# Patient Record
Sex: FEMALE | Race: WHITE | URBAN - METROPOLITAN AREA
[De-identification: names, ages, dates, MRNs, and addresses within clinical notes are randomized per-mention and may not be internally consistent; named-entity substitution may affect disease eponyms.]

---

## 2022-10-25 ENCOUNTER — APPOINTMENT (RX ONLY)
Dept: URBAN - METROPOLITAN AREA CLINIC 75 | Facility: CLINIC | Age: 62
Setting detail: DERMATOLOGY
End: 2022-10-25

## 2022-10-25 DIAGNOSIS — L40.0 PSORIASIS VULGARIS: ICD-10-CM | Status: INADEQUATELY CONTROLLED

## 2022-10-25 PROCEDURE — ? CHRONOLOGY OF PRESENT ILLNESS

## 2022-10-25 PROCEDURE — ? COUNSELING

## 2022-10-25 PROCEDURE — ? PRESCRIPTION MEDICATION MANAGEMENT

## 2022-10-25 PROCEDURE — ? PRESCRIPTION

## 2022-10-25 PROCEDURE — 99204 OFFICE O/P NEW MOD 45 MIN: CPT

## 2022-10-25 RX ORDER — CLOBETASOL PROPIONATE 0.5 MG/ML
2-3 DROPS SOLUTION TOPICAL BID
Qty: 50 | Refills: 3 | Status: ERX

## 2022-10-25 RX ORDER — FLUOCINOLONE ACETONIDE 0.11 MG/ML
2-3 DROPS OIL TOPICAL
Qty: 118.28 | Refills: 3 | Status: ERX

## 2022-10-25 RX ORDER — ROFLUMILAST 3 MG/G
THIN LAYER CREAM TOPICAL
Qty: 60 | Refills: 1 | Status: ERX

## 2022-10-25 ASSESSMENT — LOCATION DETAILED DESCRIPTION DERM
LOCATION DETAILED: RIGHT PROXIMAL DORSAL FOREARM
LOCATION DETAILED: LEFT ANTERIOR PROXIMAL THIGH
LOCATION DETAILED: PERIUMBILICAL SKIN
LOCATION DETAILED: RIGHT ANTERIOR PROXIMAL THIGH
LOCATION DETAILED: RIGHT MEDIAL FRONTAL SCALP
LOCATION DETAILED: LEFT PROXIMAL DORSAL FOREARM
LOCATION DETAILED: INFERIOR LUMBAR SPINE
LOCATION DETAILED: SUBXIPHOID
LOCATION DETAILED: LEFT DISTAL POSTERIOR THIGH

## 2022-10-25 ASSESSMENT — PGA PSORIASIS: PGA PSORIASIS 2020: MODERATE

## 2022-10-25 ASSESSMENT — LOCATION ZONE DERM
LOCATION ZONE: SCALP
LOCATION ZONE: ARM
LOCATION ZONE: TRUNK
LOCATION ZONE: LEG

## 2022-10-25 ASSESSMENT — LOCATION SIMPLE DESCRIPTION DERM
LOCATION SIMPLE: LOWER BACK
LOCATION SIMPLE: LEFT FOREARM
LOCATION SIMPLE: LEFT THIGH
LOCATION SIMPLE: RIGHT THIGH
LOCATION SIMPLE: LEFT POSTERIOR THIGH
LOCATION SIMPLE: RIGHT SCALP
LOCATION SIMPLE: ABDOMEN
LOCATION SIMPLE: RIGHT FOREARM

## 2022-10-25 ASSESSMENT — BSA PSORIASIS: % BODY COVERED IN PSORIASIS: 5

## 2022-10-25 NOTE — PROCEDURE: PRESCRIPTION MEDICATION MANAGEMENT
May 7, 2020      Bayron Golden MD  [address] [place] [address]           [facility]  [address]  [place] [address]  Phone: [phone]          Patient: Erin Clark   MR Number: [id number]   YOB: 1935   Date of Visit: 5/7/2020       Dear Dr. Bayron Golden:    Thank you for referring Erin Clark to me for evaluation. Attached you will find relevant portions of my assessment and plan of care.    If you have questions, please do not hesitate to call me. I look forward to following Erin Clark along with you.    Sincerely,    Lary Navarrete, NP    Enclosure  CC:  No Recipients    If you would like to receive this communication electronically, please contact [email or web address] or [phone] to request more information on [employer] access.    For providers and/or their staff who would like to refer a patient to [facility], please contact us through our one-stop-shop provider referral line, [facility] , at [phone].    If you feel you have received this communication in error or would no longer like to receive these types of communications, please e-mail [email or web address]         
Render In Strict Bullet Format?: No
Detail Level: Zone
Plan: Zoryve 0.3 % topical cream -thin layer to AA of psoriasis QD\\nclobetasol 0.05 % scalp solution BID - thin layer to AA of scalp for up to 2 weeks per month as needed for psoriasis. \\nDerma-Smoothe/FS Scalp Oil 0.01 % - thin layer to scalp at bedtime once weekly. Leave on for 8 hours and wash out in morning.

## 2022-10-25 NOTE — PROCEDURE: CHRONOLOGY OF PRESENT ILLNESS
Chronology Of Present Illness: 10/25/22 Initial presentation to office for longstanding history of psoriasis (predominately inverse psoriasis) since teenage years. \\n \\nPrevious medications: Lidex, Clobetasol, Mometasone \\n\\n
Detail Level: Zone

## 2022-11-22 ENCOUNTER — APPOINTMENT (RX ONLY)
Dept: URBAN - METROPOLITAN AREA CLINIC 75 | Facility: CLINIC | Age: 62
Setting detail: DERMATOLOGY
End: 2022-11-22

## 2022-11-22 DIAGNOSIS — L98.9 DISORDER OF THE SKIN AND SUBCUTANEOUS TISSUE, UNSPECIFIED: ICD-10-CM

## 2022-11-22 DIAGNOSIS — L40.0 PSORIASIS VULGARIS: ICD-10-CM | Status: INADEQUATELY CONTROLLED

## 2022-11-22 PROCEDURE — ? PRESCRIPTION

## 2022-11-22 PROCEDURE — 99214 OFFICE O/P EST MOD 30 MIN: CPT

## 2022-11-22 PROCEDURE — ? PRESCRIPTION MEDICATION MANAGEMENT

## 2022-11-22 PROCEDURE — ? CHRONOLOGY OF PRESENT ILLNESS

## 2022-11-22 PROCEDURE — ? COUNSELING

## 2022-11-22 RX ORDER — FLUOCINONIDE 0.5 MG/ML
4-5 DROPS SOLUTION TOPICAL AS DIRECTED
Qty: 60 | Refills: 2 | Status: ERX

## 2022-11-22 RX ORDER — TAPINAROF 10 MG/1000MG
THIN LAYER CREAM TOPICAL QD
Qty: 60 | Refills: 1 | Status: ERX

## 2022-11-22 ASSESSMENT — LOCATION DETAILED DESCRIPTION DERM
LOCATION DETAILED: LEFT ANTERIOR PROXIMAL THIGH
LOCATION DETAILED: PERIUMBILICAL SKIN
LOCATION DETAILED: LEFT PROXIMAL DORSAL FOREARM
LOCATION DETAILED: RIGHT PROXIMAL DORSAL FOREARM
LOCATION DETAILED: INFERIOR LUMBAR SPINE
LOCATION DETAILED: SUBXIPHOID
LOCATION DETAILED: RIGHT MEDIAL FRONTAL SCALP
LOCATION DETAILED: RIGHT ANTERIOR PROXIMAL THIGH
LOCATION DETAILED: LEFT DISTAL POSTERIOR THIGH

## 2022-11-22 ASSESSMENT — LOCATION SIMPLE DESCRIPTION DERM
LOCATION SIMPLE: LEFT THIGH
LOCATION SIMPLE: RIGHT SCALP
LOCATION SIMPLE: LEFT FOREARM
LOCATION SIMPLE: RIGHT THIGH
LOCATION SIMPLE: ABDOMEN
LOCATION SIMPLE: LEFT POSTERIOR THIGH
LOCATION SIMPLE: LOWER BACK
LOCATION SIMPLE: RIGHT FOREARM

## 2022-11-22 ASSESSMENT — LOCATION ZONE DERM
LOCATION ZONE: LEG
LOCATION ZONE: TRUNK
LOCATION ZONE: ARM
LOCATION ZONE: SCALP

## 2022-11-22 ASSESSMENT — BSA PSORIASIS: % BODY COVERED IN PSORIASIS: 10

## 2022-11-22 ASSESSMENT — PGA PSORIASIS: PGA PSORIASIS 2020: MODERATE

## 2022-11-22 NOTE — PROCEDURE: PRESCRIPTION MEDICATION MANAGEMENT
Plan: Continue Zoryve 0.3 % topical cream -thin layer to AA of psoriasis QD\\nTrial of Vtama 1% topical cream - thin layer to AA of psoriasis QD\\nFluocinonide 0.05% solution - QHS up to 2-3 weeks per month. discontinue sooner if symptoms resolve\\n\\nReview tremfya and skyrizi information.  Will discuss in greater detail at next appointment
Detail Level: Zone
Render In Strict Bullet Format?: No
Samples Given: Halog
Plan: Apply thin layer of Halog onto AA of fingers BID x 2 weeks.  Discontinue sooner if symptoms resolve

## 2022-11-22 NOTE — PROCEDURE: CHRONOLOGY OF PRESENT ILLNESS
Chronology Of Present Illness: 10/25/22 Initial presentation to office for longstanding history of psoriasis (predominately inverse psoriasis) to scalp, skin folds, and elbows since\\nteenage years. \\nPrevious medications: Lidex (improvement to scalp), Clobetasol, Mometasone (minimal improvement)\\n\\n11/22/22\\nPatient struggling with consistency of medication application but reports improvement when she uses them. Discussed Tremfya and Skyrizi today as alternative treatment options. Gave patient information brochures today. Will try start trial of vtama, Continue with Zoryve, and begin Fluocinonide 0.05% to scalp, which patient has used in the past with improvement.
Detail Level: Zone

## 2023-01-17 ENCOUNTER — APPOINTMENT (RX ONLY)
Dept: URBAN - METROPOLITAN AREA CLINIC 75 | Facility: CLINIC | Age: 63
Setting detail: DERMATOLOGY
End: 2023-01-17

## 2023-01-17 DIAGNOSIS — L40.0 PSORIASIS VULGARIS: ICD-10-CM

## 2023-01-17 DIAGNOSIS — L57.8 OTHER SKIN CHANGES DUE TO CHRONIC EXPOSURE TO NONIONIZING RADIATION: ICD-10-CM

## 2023-01-17 PROCEDURE — ? CHRONOLOGY OF PRESENT ILLNESS

## 2023-01-17 PROCEDURE — ? PRESCRIPTION

## 2023-01-17 PROCEDURE — ? COUNSELING

## 2023-01-17 PROCEDURE — ? PRESCRIPTION MEDICATION MANAGEMENT

## 2023-01-17 PROCEDURE — 99214 OFFICE O/P EST MOD 30 MIN: CPT

## 2023-01-17 RX ORDER — TRETIONIN 0.25 MG/G
PEA SIZED CREAM TOPICAL QHS
Qty: 45 | Refills: 1 | Status: ERX

## 2023-01-17 ASSESSMENT — LOCATION SIMPLE DESCRIPTION DERM
LOCATION SIMPLE: LEFT THIGH
LOCATION SIMPLE: RIGHT THIGH
LOCATION SIMPLE: LOWER BACK
LOCATION SIMPLE: RIGHT FOREARM
LOCATION SIMPLE: LEFT FOREARM
LOCATION SIMPLE: ABDOMEN
LOCATION SIMPLE: RIGHT SCALP
LOCATION SIMPLE: LEFT POSTERIOR THIGH

## 2023-01-17 ASSESSMENT — LOCATION DETAILED DESCRIPTION DERM
LOCATION DETAILED: LEFT ANTERIOR PROXIMAL THIGH
LOCATION DETAILED: RIGHT ANTERIOR PROXIMAL THIGH
LOCATION DETAILED: LEFT PROXIMAL DORSAL FOREARM
LOCATION DETAILED: INFERIOR LUMBAR SPINE
LOCATION DETAILED: SUBXIPHOID
LOCATION DETAILED: PERIUMBILICAL SKIN
LOCATION DETAILED: LEFT DISTAL POSTERIOR THIGH
LOCATION DETAILED: RIGHT PROXIMAL DORSAL FOREARM
LOCATION DETAILED: RIGHT MEDIAL FRONTAL SCALP

## 2023-01-17 ASSESSMENT — LOCATION ZONE DERM
LOCATION ZONE: ARM
LOCATION ZONE: SCALP
LOCATION ZONE: TRUNK
LOCATION ZONE: LEG

## 2023-01-17 NOTE — PROCEDURE: CHRONOLOGY OF PRESENT ILLNESS
Chronology Of Present Illness: 10/25/22 Initial presentation to office for longstanding history of psoriasis (predominately inverse psoriasis) to scalp, skin folds, and elbows since\\nteenage years. \\nPrevious medications: Lidex (improvement to scalp), Clobetasol, Mometasone (minimal improvement)\\n\\n11/22/22\\nPatient struggling with consistency of medication application but reports improvement when she uses them. Discussed Tremfya and Skyrizi today as alternative treatment options. Gave patient information brochures today. Will try start trial of vtama, Continue with Zoryve, and begin Fluocinonide 0.05% to scalp, which patient has used in the past with improvement. \\n\\n1/17/23\\nPatient struggling with complaince of topicals. She felt that [employer] sample worked better than zoryve but awaiting PA response from insurance for [employer]. She is not interested in biologic therapy at this time and would like to try dietary changes first. She is smoker and I recommended smoking cessation and recommended the [place] quit line.
Detail Level: Zone

## 2023-01-17 NOTE — PROCEDURE: PRESCRIPTION MEDICATION MANAGEMENT
Plan: Continue Zoryve 0.3 % topical cream -thin layer to AA of psoriasis QD\\nTrial of Vtama 1% topical cream - thin layer to AA of psoriasis QD\\nFluocinonide 0.05% solution - QHS up to 2-3 weeks per months PRN for flares.
Detail Level: Zone
Render In Strict Bullet Format?: No
Plan: tretinoin 0.025 % topical cream -pea sized amount to clean face QHS. Starting twice weekly and advancing to nightly as tolerated.

## 2023-02-07 ENCOUNTER — RX ONLY (OUTPATIENT)
Age: 63
Setting detail: RX ONLY
End: 2023-02-07

## 2023-02-07 RX ORDER — TRETIONIN 0.25 MG/G
PEA SIZED CREAM TOPICAL QHS
Qty: 45 | Refills: 1 | Status: ERX

## 2023-05-24 ENCOUNTER — APPOINTMENT (RX ONLY)
Dept: URBAN - METROPOLITAN AREA CLINIC 75 | Facility: CLINIC | Age: 63
Setting detail: DERMATOLOGY
End: 2023-05-24

## 2023-05-24 DIAGNOSIS — L40.0 PSORIASIS VULGARIS: ICD-10-CM | Status: IMPROVED

## 2023-05-24 DIAGNOSIS — L57.8 OTHER SKIN CHANGES DUE TO CHRONIC EXPOSURE TO NONIONIZING RADIATION: ICD-10-CM

## 2023-05-24 PROCEDURE — ? PRESCRIPTION

## 2023-05-24 PROCEDURE — ? CHRONOLOGY OF PRESENT ILLNESS

## 2023-05-24 PROCEDURE — ? COUNSELING

## 2023-05-24 PROCEDURE — 99213 OFFICE O/P EST LOW 20 MIN: CPT

## 2023-05-24 PROCEDURE — ? PRESCRIPTION MEDICATION MANAGEMENT

## 2023-05-24 RX ORDER — TRETIONIN 0.5 MG/G
PEA SIZE CREAM TOPICAL QHS
Qty: 20 | Refills: 3 | Status: ERX

## 2023-05-24 RX ORDER — FLUOCINONIDE 0.5 MG/ML
THIN LAYER SOLUTION TOPICAL AS DIRECTED
Qty: 60 | Refills: 2 | Status: ERX

## 2023-05-24 ASSESSMENT — LOCATION DETAILED DESCRIPTION DERM
LOCATION DETAILED: SUBXIPHOID
LOCATION DETAILED: RIGHT PROXIMAL DORSAL FOREARM
LOCATION DETAILED: LEFT PROXIMAL DORSAL FOREARM
LOCATION DETAILED: RIGHT ANTERIOR PROXIMAL THIGH
LOCATION DETAILED: LEFT ANTERIOR PROXIMAL THIGH
LOCATION DETAILED: PERIUMBILICAL SKIN
LOCATION DETAILED: RIGHT MEDIAL FRONTAL SCALP
LOCATION DETAILED: LEFT DISTAL POSTERIOR THIGH
LOCATION DETAILED: INFERIOR LUMBAR SPINE

## 2023-05-24 ASSESSMENT — LOCATION SIMPLE DESCRIPTION DERM
LOCATION SIMPLE: LEFT THIGH
LOCATION SIMPLE: RIGHT SCALP
LOCATION SIMPLE: LOWER BACK
LOCATION SIMPLE: RIGHT FOREARM
LOCATION SIMPLE: LEFT POSTERIOR THIGH
LOCATION SIMPLE: RIGHT THIGH
LOCATION SIMPLE: ABDOMEN
LOCATION SIMPLE: LEFT FOREARM

## 2023-05-24 ASSESSMENT — LOCATION ZONE DERM
LOCATION ZONE: SCALP
LOCATION ZONE: LEG
LOCATION ZONE: ARM
LOCATION ZONE: TRUNK

## 2023-05-24 NOTE — PROCEDURE: PRESCRIPTION MEDICATION MANAGEMENT
Plan: Continue Zoryve or VTAMA cream -thin layer to AA of psoriasis QD\\n for flares \\nFluocinonide 0.05% solution - QHS up to 2-3 weeks per months PRN for flares.
Detail Level: Zone
Render In Strict Bullet Format?: No
Plan: tretinoin 0.05 % topical cream -pea sized amount to clean face QHS. Starting twice weekly and advancing to nightly as tolerated.

## 2023-05-24 NOTE — PROCEDURE: CHRONOLOGY OF PRESENT ILLNESS
Chronology Of Present Illness: 10/25/22 Initial presentation to office for longstanding history of psoriasis (predominately inverse psoriasis) to scalp, skin folds, and elbows since\\nteenage years. \\nPrevious medications: Lidex (improvement to scalp), Clobetasol, Mometasone (minimal improvement)\\n\\n11/22/22\\nPatient struggling with consistency of medication application but reports improvement when she uses them. Discussed Tremfya and Skyrizi today as alternative treatment options. Gave patient information brochures today. Will try start trial of vtama, Continue with Zoryve, and begin Fluocinonide 0.05% to scalp, which patient has used in the past with improvement. \\n\\n1/17/23\\nPatient struggling with complaince of topicals. She felt that [employer]  sample worked better than zoryve but awaiting PA response from insurance for [employer] . She is not interested in biologic therapy at this time and would like to try dietary changes first. She is smoker and I recommended smoking cessation and recommended the [place] quit line. \\n\\n5/24/23\\nSignificant improved on extremities. Patient reports making some dietary lifestyle changes which seem to have helped psoriasis. She did not use VTAMA or zoryve. Reports scalp being only area that still flares. Will send in refills of fluocinonide solution. Patient still smokes occasionally, but trying to stop. Patient to f/u in 1 year or sooner if psoriasis worsens.
Detail Level: Zone

## 2024-08-12 ENCOUNTER — APPOINTMENT (RX ONLY)
Dept: URBAN - METROPOLITAN AREA CLINIC 75 | Facility: CLINIC | Age: 64
Setting detail: DERMATOLOGY
End: 2024-08-12

## 2024-08-12 DIAGNOSIS — L40.0 PSORIASIS VULGARIS: ICD-10-CM

## 2024-08-12 DIAGNOSIS — Z85.828 PERSONAL HISTORY OF OTHER MALIGNANT NEOPLASM OF SKIN: ICD-10-CM

## 2024-08-12 DIAGNOSIS — L57.8 OTHER SKIN CHANGES DUE TO CHRONIC EXPOSURE TO NONIONIZING RADIATION: ICD-10-CM

## 2024-08-12 PROCEDURE — ? PRESCRIPTION

## 2024-08-12 PROCEDURE — ? CHRONOLOGY OF PRESENT ILLNESS

## 2024-08-12 PROCEDURE — ? ADDITIONAL NOTES

## 2024-08-12 PROCEDURE — ? PRESCRIPTION MEDICATION MANAGEMENT

## 2024-08-12 PROCEDURE — ? COUNSELING

## 2024-08-12 PROCEDURE — 99213 OFFICE O/P EST LOW 20 MIN: CPT

## 2024-08-12 RX ORDER — TRIAMCINOLONE ACETONIDE 0.25 MG/G
THIN LAYER CREAM TOPICAL BID
Qty: 15 | Refills: 0 | Status: ERX | COMMUNITY
Start: 2024-08-12

## 2024-08-12 RX ORDER — TRETIONIN 1 MG/G
THIN LAYER CREAM TOPICAL QHS
Qty: 45 | Refills: 3 | Status: ERX | COMMUNITY
Start: 2024-08-12

## 2024-08-12 RX ADMIN — TRIAMCINOLONE ACETONIDE THIN LAYER: 0.25 CREAM TOPICAL at 00:00

## 2024-08-12 RX ADMIN — TRETIONIN THIN LAYER: 1 CREAM TOPICAL at 00:00

## 2024-08-12 ASSESSMENT — LOCATION SIMPLE DESCRIPTION DERM
LOCATION SIMPLE: RIGHT SCALP
LOCATION SIMPLE: LEFT POSTERIOR THIGH
LOCATION SIMPLE: LOWER BACK
LOCATION SIMPLE: RIGHT FOREHEAD
LOCATION SIMPLE: ABDOMEN
LOCATION SIMPLE: LEFT THIGH
LOCATION SIMPLE: RIGHT THIGH
LOCATION SIMPLE: RIGHT FOREARM
LOCATION SIMPLE: LEFT FOREARM

## 2024-08-12 ASSESSMENT — LOCATION ZONE DERM
LOCATION ZONE: SCALP
LOCATION ZONE: ARM
LOCATION ZONE: TRUNK
LOCATION ZONE: FACE
LOCATION ZONE: LEG

## 2024-08-12 ASSESSMENT — LOCATION DETAILED DESCRIPTION DERM
LOCATION DETAILED: PERIUMBILICAL SKIN
LOCATION DETAILED: RIGHT PROXIMAL DORSAL FOREARM
LOCATION DETAILED: INFERIOR LUMBAR SPINE
LOCATION DETAILED: SUBXIPHOID
LOCATION DETAILED: LEFT PROXIMAL DORSAL FOREARM
LOCATION DETAILED: RIGHT ANTERIOR PROXIMAL THIGH
LOCATION DETAILED: RIGHT INFERIOR LATERAL FOREHEAD
LOCATION DETAILED: RIGHT MEDIAL FRONTAL SCALP
LOCATION DETAILED: LEFT ANTERIOR PROXIMAL THIGH
LOCATION DETAILED: LEFT DISTAL POSTERIOR THIGH

## 2024-08-12 ASSESSMENT — BSA PSORIASIS: % BODY COVERED IN PSORIASIS: 2

## 2024-08-12 ASSESSMENT — PGA PSORIASIS: PGA PSORIASIS 2020: ALMOST CLEAR

## 2024-08-12 ASSESSMENT — ITCH NUMERIC RATING SCALE: HOW SEVERE IS YOUR ITCHING?: 1

## 2024-08-12 NOTE — PROCEDURE: CHRONOLOGY OF PRESENT ILLNESS
Chronology Of Present Illness: 10/25/22 Initial presentation to office for longstanding history of psoriasis (predominately inverse psoriasis) to scalp, skin folds, and elbows since\\nteenage years.\\nPrevious medications: Lidex (improvement to scalp), Clobetasol, Mometasone (minimal improvement)\\n\\n11/22/22\\nPatient struggling with consistency of medication application but reports improvement when she uses them. Discussed Tremfya and Skyrizi today as alternative treatment options. Gave patient information brochures today. Will try start trial of vtama, Continue with Zoryve, and begin Fluocinonide 0.05% to scalp, which patient has used in the past with improvement.\\n\\n1/17/23\\nPatient struggling with complaince of topicals. She felt that VTAMA sample worked better than zoryve but awaiting PA response from insurance for VTAMA. She is not interested in biologic therapy at this time and would like to try dietary changes first. She is smoker and I recommended smoking cessation and recommended the [place] quit line.\\n\\n5/24/23\\nSignificant improved on extremities. Patient reports making some dietary lifestyle changes which seem to have helped psoriasis. She did not use VTAMA or zoryve.  Reports scalp being only area that still flares. Will send in refills of fluocinonide solution. Patient still smokes occasionally, but trying to stop. Patient to f/u in 1 year or sooner if psoriasis worsens.\\n\\n8/12/24\\nPsoriasis nearly clear.  Flare to inframammary creases, will send TAC. Has not needed to use topicals in several months.  Photos taken  today. Pt to f/u in 1 month for recheck.
Detail Level: Zone

## 2024-08-12 NOTE — PROCEDURE: PRESCRIPTION MEDICATION MANAGEMENT
Continue Regimen: Fluocinonide 0.05% solution - QHS up to 2-3 weeks per months PRN for flares.
Initiate Treatment: triamcinolone acetonide 0.025 % topical cream BID: Apply thin layer to AA on chest BID x2 weeks.
Detail Level: Zone
Render In Strict Bullet Format?: No
Discontinue Regimen: tretinoin 0.05 % topical cream -pea sized amount to clean face QHS. Starting twice weekly and advancing to nightly as tolerated.
Initiate Treatment: tretinoin 0.1 % topical cream QHS: Apply a pea size amount to entire face QHS

## 2024-09-17 ENCOUNTER — APPOINTMENT (RX ONLY)
Dept: URBAN - METROPOLITAN AREA CLINIC 75 | Facility: CLINIC | Age: 64
Setting detail: DERMATOLOGY
End: 2024-09-17

## 2024-09-17 DIAGNOSIS — L57.8 OTHER SKIN CHANGES DUE TO CHRONIC EXPOSURE TO NONIONIZING RADIATION: ICD-10-CM

## 2024-09-17 DIAGNOSIS — L40.0 PSORIASIS VULGARIS: ICD-10-CM | Status: WELL CONTROLLED

## 2024-09-17 DIAGNOSIS — Z85.828 PERSONAL HISTORY OF OTHER MALIGNANT NEOPLASM OF SKIN: ICD-10-CM

## 2024-09-17 DIAGNOSIS — L82.1 OTHER SEBORRHEIC KERATOSIS: ICD-10-CM

## 2024-09-17 DIAGNOSIS — D22 MELANOCYTIC NEVI: ICD-10-CM

## 2024-09-17 PROBLEM — D22.61 MELANOCYTIC NEVI OF RIGHT UPPER LIMB, INCLUDING SHOULDER: Status: ACTIVE | Noted: 2024-09-17

## 2024-09-17 PROBLEM — D22.5 MELANOCYTIC NEVI OF TRUNK: Status: ACTIVE | Noted: 2024-09-17

## 2024-09-17 PROBLEM — D22.39 MELANOCYTIC NEVI OF OTHER PARTS OF FACE: Status: ACTIVE | Noted: 2024-09-17

## 2024-09-17 PROBLEM — D22.62 MELANOCYTIC NEVI OF LEFT UPPER LIMB, INCLUDING SHOULDER: Status: ACTIVE | Noted: 2024-09-17

## 2024-09-17 PROBLEM — D22.72 MELANOCYTIC NEVI OF LEFT LOWER LIMB, INCLUDING HIP: Status: ACTIVE | Noted: 2024-09-17

## 2024-09-17 PROBLEM — D22.71 MELANOCYTIC NEVI OF RIGHT LOWER LIMB, INCLUDING HIP: Status: ACTIVE | Noted: 2024-09-17

## 2024-09-17 PROCEDURE — ? SUNSCREEN RECOMMENDATIONS

## 2024-09-17 PROCEDURE — ? CHRONOLOGY OF PRESENT ILLNESS

## 2024-09-17 PROCEDURE — ? ADDITIONAL NOTES

## 2024-09-17 PROCEDURE — ? COUNSELING

## 2024-09-17 PROCEDURE — 99213 OFFICE O/P EST LOW 20 MIN: CPT

## 2024-09-17 PROCEDURE — ? PRESCRIPTION MEDICATION MANAGEMENT

## 2024-09-17 ASSESSMENT — LOCATION DETAILED DESCRIPTION DERM
LOCATION DETAILED: RIGHT ANTERIOR DISTAL UPPER ARM
LOCATION DETAILED: RIGHT ANTERIOR PROXIMAL THIGH
LOCATION DETAILED: RIGHT PROXIMAL DORSAL FOREARM
LOCATION DETAILED: LEFT DISTAL POSTERIOR THIGH
LOCATION DETAILED: INFERIOR LUMBAR SPINE
LOCATION DETAILED: PERIUMBILICAL SKIN
LOCATION DETAILED: RIGHT ANTERIOR DISTAL THIGH
LOCATION DETAILED: MIDDLE STERNUM
LOCATION DETAILED: LEFT ANTERIOR PROXIMAL THIGH
LOCATION DETAILED: RIGHT INFERIOR LATERAL FOREHEAD
LOCATION DETAILED: INFERIOR MID FOREHEAD
LOCATION DETAILED: LEFT PROXIMAL DORSAL FOREARM
LOCATION DETAILED: SUBXIPHOID
LOCATION DETAILED: LEFT ANTERIOR DISTAL THIGH
LOCATION DETAILED: RIGHT INFERIOR MEDIAL FOREHEAD
LOCATION DETAILED: LEFT ANTERIOR DISTAL UPPER ARM
LOCATION DETAILED: RIGHT MEDIAL FRONTAL SCALP

## 2024-09-17 ASSESSMENT — LOCATION ZONE DERM
LOCATION ZONE: LEG
LOCATION ZONE: FACE
LOCATION ZONE: SCALP
LOCATION ZONE: TRUNK
LOCATION ZONE: ARM

## 2024-09-17 ASSESSMENT — LOCATION SIMPLE DESCRIPTION DERM
LOCATION SIMPLE: LEFT UPPER ARM
LOCATION SIMPLE: LEFT FOREARM
LOCATION SIMPLE: INFERIOR FOREHEAD
LOCATION SIMPLE: RIGHT FOREARM
LOCATION SIMPLE: RIGHT THIGH
LOCATION SIMPLE: LEFT POSTERIOR THIGH
LOCATION SIMPLE: RIGHT UPPER ARM
LOCATION SIMPLE: RIGHT SCALP
LOCATION SIMPLE: LEFT THIGH
LOCATION SIMPLE: ABDOMEN
LOCATION SIMPLE: RIGHT FOREHEAD
LOCATION SIMPLE: CHEST
LOCATION SIMPLE: LOWER BACK

## 2024-09-17 ASSESSMENT — BSA PSORIASIS: % BODY COVERED IN PSORIASIS: 5

## 2024-09-17 ASSESSMENT — PGA PSORIASIS: PGA PSORIASIS 2020: ALMOST CLEAR

## 2024-09-17 NOTE — PROCEDURE: ADDITIONAL NOTES
Detail Level: Detailed
Render Risk Assessment In Note?: no
Additional Notes: Pt reported- Mohs June 2019

## 2024-09-17 NOTE — PROCEDURE: PRESCRIPTION MEDICATION MANAGEMENT
Samples Given: Zoryve
Continue Regimen: Fluocinonide 0.05% solution- Apply thin layer QHS to scalp up to 2 weeks per month PRN for flares.\\ntriamcinolone acetonide 0.025 % topical cream BID- Apply thin layer QD to AA on chest, abdomen PRN for flares.\\nZoryve- Apply thin layer QD to AA PRN for flares (samples given, will call for rx)
Detail Level: Zone
Render In Strict Bullet Format?: No

## 2024-09-17 NOTE — PROCEDURE: CHRONOLOGY OF PRESENT ILLNESS
Chronology Of Present Illness: 10/25/22 Initial presentation to office for longstanding history of psoriasis (predominately inverse psoriasis) to scalp, skin folds, and elbows since\\nteenage years.\\nPrevious medications: Lidex (improvement to scalp), Clobetasol, Mometasone (minimal improvement)\\n\\n11/22/22\\nPatient struggling with consistency of medication application but reports improvement when she uses them. Discussed Tremfya and Skyrizi today as alternative treatment options. Gave patient information brochures today. Will try start trial of vtama, Continue with Zoryve, and begin Fluocinonide 0.05% to scalp, which patient has used in the past with improvement.\\n\\n1/17/23\\nPatient struggling with complaince of topicals. She felt that VTAMA sample worked better than zoryve but awaiting PA response from insurance for VTAMA. She is not interested in biologic therapy at this time and would like to try dietary changes first. She is smoker and I recommended smoking cessation and recommended the [place] quit line.\\n\\n5/24/23\\nSignificant improved on extremities. Patient reports making some dietary lifestyle changes which seem to have helped psoriasis. She did not use VTAMA or zoryve.  Reports scalp being only area that still flares. Will send in refills of fluocinonide solution. Patient still smokes occasionally, but trying to stop. Patient to f/u in 1 year or sooner if psoriasis worsens.\\n\\n8/12/24\\nPsoriasis nearly clear.  Flare to inframammary creases, will send TAC. Has not needed to use topicals in several months.  Photos taken  today. Pt to f/u in 1 month for recheck.\\n\\n9/17/24\\nPatient reports psoriasis has improved with triamcinolone. Can continue using triamcinolone PRN for flares. Will give samples of Zoryve for use PRN for flares as well.
Detail Level: Zone

## 2025-07-10 ENCOUNTER — APPOINTMENT (OUTPATIENT)
Dept: URBAN - METROPOLITAN AREA CLINIC 75 | Facility: CLINIC | Age: 65
Setting detail: DERMATOLOGY
End: 2025-07-10

## 2025-07-10 DIAGNOSIS — Z85.828 PERSONAL HISTORY OF OTHER MALIGNANT NEOPLASM OF SKIN: ICD-10-CM

## 2025-07-10 DIAGNOSIS — L72.8 OTHER FOLLICULAR CYSTS OF THE SKIN AND SUBCUTANEOUS TISSUE: ICD-10-CM

## 2025-07-10 DIAGNOSIS — L57.8 OTHER SKIN CHANGES DUE TO CHRONIC EXPOSURE TO NONIONIZING RADIATION: ICD-10-CM

## 2025-07-10 DIAGNOSIS — D22 MELANOCYTIC NEVI: ICD-10-CM

## 2025-07-10 DIAGNOSIS — L82.1 OTHER SEBORRHEIC KERATOSIS: ICD-10-CM

## 2025-07-10 DIAGNOSIS — L40.0 PSORIASIS VULGARIS: ICD-10-CM

## 2025-07-10 PROBLEM — D22.62 MELANOCYTIC NEVI OF LEFT UPPER LIMB, INCLUDING SHOULDER: Status: ACTIVE | Noted: 2025-07-10

## 2025-07-10 PROBLEM — D22.39 MELANOCYTIC NEVI OF OTHER PARTS OF FACE: Status: ACTIVE | Noted: 2025-07-10

## 2025-07-10 PROBLEM — D22.5 MELANOCYTIC NEVI OF TRUNK: Status: ACTIVE | Noted: 2025-07-10

## 2025-07-10 PROBLEM — D22.61 MELANOCYTIC NEVI OF RIGHT UPPER LIMB, INCLUDING SHOULDER: Status: ACTIVE | Noted: 2025-07-10

## 2025-07-10 PROBLEM — D22.71 MELANOCYTIC NEVI OF RIGHT LOWER LIMB, INCLUDING HIP: Status: ACTIVE | Noted: 2025-07-10

## 2025-07-10 PROBLEM — D22.72 MELANOCYTIC NEVI OF LEFT LOWER LIMB, INCLUDING HIP: Status: ACTIVE | Noted: 2025-07-10

## 2025-07-10 PROCEDURE — ? PRESCRIPTION

## 2025-07-10 PROCEDURE — ? ADDITIONAL NOTES

## 2025-07-10 PROCEDURE — ? PRESCRIPTION MEDICATION MANAGEMENT

## 2025-07-10 PROCEDURE — ? COUNSELING

## 2025-07-10 PROCEDURE — ?

## 2025-07-10 PROCEDURE — ? CHRONOLOGY OF PRESENT ILLNESS

## 2025-07-10 RX ORDER — TRETIONIN 1 MG/G
THIN LAYER CREAM TOPICAL QHS
Qty: 45 | Refills: 6 | Status: ERX

## 2025-07-10 ASSESSMENT — LOCATION SIMPLE DESCRIPTION DERM
LOCATION SIMPLE: RIGHT SCALP
LOCATION SIMPLE: LEFT CHEEK
LOCATION SIMPLE: RIGHT PRETIBIAL REGION
LOCATION SIMPLE: LEFT THIGH
LOCATION SIMPLE: RIGHT FOREHEAD
LOCATION SIMPLE: LEFT SHOULDER
LOCATION SIMPLE: LEFT FOREARM
LOCATION SIMPLE: LEFT FOREHEAD
LOCATION SIMPLE: INFERIOR FOREHEAD
LOCATION SIMPLE: RIGHT CHEEK
LOCATION SIMPLE: CHEST
LOCATION SIMPLE: LOWER BACK
LOCATION SIMPLE: RIGHT FOREARM
LOCATION SIMPLE: RIGHT THIGH
LOCATION SIMPLE: ABDOMEN
LOCATION SIMPLE: LEFT PRETIBIAL REGION
LOCATION SIMPLE: LEFT UPPER ARM
LOCATION SIMPLE: RIGHT UPPER ARM
LOCATION SIMPLE: LEFT POSTERIOR THIGH

## 2025-07-10 ASSESSMENT — LOCATION DETAILED DESCRIPTION DERM
LOCATION DETAILED: EPIGASTRIC SKIN
LOCATION DETAILED: LEFT ANTERIOR DISTAL THIGH
LOCATION DETAILED: RIGHT ANTERIOR PROXIMAL THIGH
LOCATION DETAILED: RIGHT ANTERIOR DISTAL UPPER ARM
LOCATION DETAILED: LEFT ANTERIOR PROXIMAL THIGH
LOCATION DETAILED: LEFT DISTAL POSTERIOR THIGH
LOCATION DETAILED: LEFT FOREHEAD
LOCATION DETAILED: LEFT SUPERIOR CENTRAL MALAR CHEEK
LOCATION DETAILED: INFERIOR MID FOREHEAD
LOCATION DETAILED: RIGHT PROXIMAL DORSAL FOREARM
LOCATION DETAILED: RIGHT INFERIOR LATERAL FOREHEAD
LOCATION DETAILED: INFERIOR LUMBAR SPINE
LOCATION DETAILED: RIGHT ANTERIOR DISTAL THIGH
LOCATION DETAILED: LEFT ANTERIOR DISTAL UPPER ARM
LOCATION DETAILED: LEFT MEDIAL SUPERIOR CHEST
LOCATION DETAILED: LEFT POSTERIOR SHOULDER
LOCATION DETAILED: RIGHT VENTRAL PROXIMAL FOREARM
LOCATION DETAILED: LEFT PROXIMAL PRETIBIAL REGION
LOCATION DETAILED: LEFT PROXIMAL DORSAL FOREARM
LOCATION DETAILED: RIGHT ANTERIOR PROXIMAL UPPER ARM
LOCATION DETAILED: STERNAL NOTCH
LOCATION DETAILED: PERIUMBILICAL SKIN
LOCATION DETAILED: SUBXIPHOID
LOCATION DETAILED: RIGHT MEDIAL FRONTAL SCALP
LOCATION DETAILED: RIGHT PROXIMAL PRETIBIAL REGION
LOCATION DETAILED: LEFT VENTRAL PROXIMAL FOREARM
LOCATION DETAILED: RIGHT CENTRAL MALAR CHEEK

## 2025-07-10 ASSESSMENT — LOCATION ZONE DERM
LOCATION ZONE: ARM
LOCATION ZONE: TRUNK
LOCATION ZONE: LEG
LOCATION ZONE: FACE
LOCATION ZONE: SCALP

## 2025-07-10 NOTE — PROCEDURE: CHRONOLOGY OF PRESENT ILLNESS
Chronology Of Present Illness: 10/25/22 Initial presentation to office for longstanding history of psoriasis (predominately inverse psoriasis) to scalp, skin folds, and elbows since\\nteenage years.\\nPrevious medications: Lidex (improvement to scalp), Clobetasol, Mometasone (minimal improvement)\\n\\n11/22/22\\nPatient struggling with consistency of medication application but reports improvement when she uses them. Discussed Tremfya and Skyrizi today as alternative treatment options. Gave patient information brochures today. Will try start trial of vtama, Continue with Zoryve, and begin Fluocinonide 0.05% to scalp, which patient has used in the past with improvement.\\n\\n1/17/23\\nPatient struggling with complaince of topicals. She felt that VTAMA sample worked better than zoryve but awaiting PA response from insurance for VTAMA. She is not interested in biologic therapy at this time and would like to try dietary changes first. She is smoker and I recommended smoking cessation and recommended the [place] quit line.\\n\\n5/24/23\\nSignificant improved on extremities. Patient reports making some dietary lifestyle changes which seem to have helped psoriasis. She did not use VTAMA or zoryve.  Reports scalp being only area that still flares. Will send in refills of fluocinonide solution. Patient still smokes occasionally, but trying to stop. Patient to f/u in 1 year or sooner if psoriasis worsens.\\n\\n8/12/24\\nPsoriasis nearly clear.  Flare to inframammary creases, will send TAC. Has not needed to use topicals in several months.  Photos taken  today. Pt to f/u in 1 month for recheck.\\n\\n9/17/24\\nPatient reports psoriasis has improved with triamcinolone. Can continue using triamcinolone PRN for flares. Will give samples of Zoryve for use PRN for flares as well.\\n\\n7/10/25\\nPatient states her psoriasis is clear at this time. She states she is not currently using any treatment. Patient states she has made lifestyle changes such as diet which seems to be helping. She can use her zoryve samples, and topical steroid PRN flares
Detail Level: Zone

## 2025-07-10 NOTE — PROCEDURE: PRESCRIPTION MEDICATION MANAGEMENT
Continue Regimen: Fluocinonide 0.05% solution- Apply thin layer QHS to scalp up to 2 weeks per month PRN for flares.\\ntriamcinolone acetonide 0.025 % topical cream BID- Apply thin layer QD to AA on chest, abdomen PRN for flares.\\nZoryve- Apply thin layer QD to AA PRN for flares (samples from last visit)
Detail Level: Zone
Render In Strict Bullet Format?: No
Continue Regimen: tretinoin 0.1 % topical cream QHS: Apply a pea size amount to entire face QHS

## 2025-07-10 NOTE — PROCEDURE: ADDITIONAL NOTES
Detail Level: Detailed
Render Risk Assessment In Note?: no
Additional Notes: Pt reported- Mohs June 2019
Additional Notes: Patient states it does not bother her at this time. She is aware that she can have the cyst excised if it becomes bothersome